# Patient Record
Sex: MALE | Race: WHITE | Employment: OTHER | URBAN - METROPOLITAN AREA
[De-identification: names, ages, dates, MRNs, and addresses within clinical notes are randomized per-mention and may not be internally consistent; named-entity substitution may affect disease eponyms.]

---

## 2017-04-14 ENCOUNTER — ANESTHESIA EVENT (OUTPATIENT)
Dept: SURGERY | Age: 77
End: 2017-04-14
Payer: MEDICARE

## 2017-04-17 ENCOUNTER — ANESTHESIA (OUTPATIENT)
Dept: SURGERY | Age: 77
End: 2017-04-17
Payer: MEDICARE

## 2017-04-17 PROCEDURE — 74011000250 HC RX REV CODE- 250

## 2017-04-17 PROCEDURE — 74011250636 HC RX REV CODE- 250/636: Performed by: ANESTHESIOLOGY

## 2017-04-17 PROCEDURE — 74011250636 HC RX REV CODE- 250/636

## 2017-04-17 RX ORDER — LIDOCAINE HYDROCHLORIDE 20 MG/ML
INJECTION, SOLUTION EPIDURAL; INFILTRATION; INTRACAUDAL; PERINEURAL AS NEEDED
Status: DISCONTINUED | OUTPATIENT
Start: 2017-04-17 | End: 2017-04-17 | Stop reason: HOSPADM

## 2017-04-17 RX ORDER — PROPOFOL 10 MG/ML
INJECTION, EMULSION INTRAVENOUS AS NEEDED
Status: DISCONTINUED | OUTPATIENT
Start: 2017-04-17 | End: 2017-04-17 | Stop reason: HOSPADM

## 2017-04-17 RX ADMIN — PROPOFOL 40 MG: 10 INJECTION, EMULSION INTRAVENOUS at 07:54

## 2017-04-17 RX ADMIN — SODIUM CHLORIDE, POTASSIUM CHLORIDE, SODIUM LACTATE AND CALCIUM CHLORIDE: 600; 310; 30; 20 INJECTION, SOLUTION INTRAVENOUS at 07:15

## 2017-04-17 RX ADMIN — PROPOFOL 80 MG: 10 INJECTION, EMULSION INTRAVENOUS at 07:40

## 2017-04-17 RX ADMIN — PROPOFOL 30 MG: 10 INJECTION, EMULSION INTRAVENOUS at 07:46

## 2017-04-17 RX ADMIN — LIDOCAINE HYDROCHLORIDE 50 MG: 20 INJECTION, SOLUTION EPIDURAL; INFILTRATION; INTRACAUDAL; PERINEURAL at 07:40

## 2017-04-17 RX ADMIN — PROPOFOL 30 MG: 10 INJECTION, EMULSION INTRAVENOUS at 07:50

## 2017-04-17 RX ADMIN — PROPOFOL 20 MG: 10 INJECTION, EMULSION INTRAVENOUS at 07:41

## 2017-04-17 NOTE — ANESTHESIA PREPROCEDURE EVALUATION
Anesthetic History   No history of anesthetic complications            Review of Systems / Medical History  Patient summary reviewed, nursing notes reviewed and pertinent labs reviewed    Pulmonary  Within defined limits                 Neuro/Psych   Within defined limits           Cardiovascular    Hypertension: well controlled              Exercise tolerance: >4 METS     GI/Hepatic/Renal  Within defined limits              Endo/Other    Diabetes: type 2    Arthritis     Other Findings              Physical Exam    Airway  Mallampati: I  TM Distance: 4 - 6 cm  Neck ROM: normal range of motion   Mouth opening: Normal     Cardiovascular    Rhythm: regular  Rate: normal      Pertinent negatives: No murmur   Dental  No notable dental hx       Pulmonary  Breath sounds clear to auscultation               Abdominal  GI exam deferred       Other Findings            Anesthetic Plan    ASA: 2  Anesthesia type: general and total IV anesthesia          Induction: Intravenous  Anesthetic plan and risks discussed with: Patient      preop glucose 166

## 2017-04-17 NOTE — ANESTHESIA POSTPROCEDURE EVALUATION
Post-Anesthesia Evaluation and Assessment    Patient: Jes Jauregui MRN: [id number]  SSN: [id number]    YOB: 1940  Age: 78 y.o. Sex: male       Cardiovascular Function/Vital Signs  Visit Vitals    /68    Pulse 65    Temp 36.6 °C (97.8 °F)    Resp 12    Ht 6' 2\" (1.88 m)    Wt 102.1 kg (225 lb)    SpO2 99%    BMI 28.89 kg/m2       Patient is status post general, total IV anesthesia anesthesia for Procedure(s):  COLONOSCOPY  ENDOSCOPIC POLYPECTOMY. Nausea/Vomiting: None    Postoperative hydration reviewed and adequate. Pain:  Pain Scale 1: Numeric (0 - 10) (04/17/17 4769)  Pain Intensity 1: 0 (04/17/17 3551)   Managed    Neurological Status:   Neuro (WDL): Exceptions to WDL (04/17/17 0827)  Neuro  Neurologic State: Alert (04/17/17 0827)  LUE Motor Response: Purposeful (04/17/17 0827)  LLE Motor Response: Purposeful (04/17/17 0827)  RUE Motor Response: Purposeful (04/17/17 0827)  RLE Motor Response: Purposeful (04/17/17 0827)   At baseline    Mental Status and Level of Consciousness: Arousable    Pulmonary Status:   O2 Device: Room air (04/17/17 0807)   Adequate oxygenation and airway patent    Complications related to anesthesia: None    Post-anesthesia assessment completed.  No concerns    Signed By: Yogi Zuniga MD     April 17, 2017

## 2022-02-21 RX ORDER — METFORMIN HYDROCHLORIDE 1000 MG/1
1000 TABLET ORAL 2 TIMES DAILY WITH MEALS
COMMUNITY

## 2022-02-21 RX ORDER — ATORVASTATIN CALCIUM 40 MG/1
40 TABLET, FILM COATED ORAL DAILY
COMMUNITY

## 2022-02-21 NOTE — PERIOP NOTES
[facility]  Ambulatory Surgery Unit  Pre-operative Instructions    Surgery/Procedure Date  Monday, February 28, 2022            Tentative Arrival Time TBD      1. On the day of your surgery/procedure, please report to the Ambulatory Surgery Unit Registration Desk and sign in at your designated time. The Ambulatory Surgery Unit is located in [facility] on the [facility] side of the [facility] across from the [address] [place] [address]. Please have all of your health insurance cards and a photo ID. **Due to current COVID restrictions, only ONE adult may accompany you the day of the procedure. We have limited seating available. If our waiting room is at capacity, your ride may be asked to remain in their vehicle. No children are allowed in the waiting room. 2. You must have someone with you to drive you home, as you should not drive a car for 24 hours following anesthesia. Please make arrangements for a responsible adult friend or family member to stay with you for at least the first 24 hours after your surgery. 3. Do not have anything to eat or drink (including water, gum, mints, coffee, juice) after 11:59 PM, Sunday. This may not apply to medications prescribed by your physician. (Please note below the special instructions with medications to take the morning of surgery, if applicable.)    4. We recommend you do not drink any alcoholic beverages for 24 hours before and after your surgery. 5. Contact your surgeons office for instructions on the following medications: non-steroidal anti-inflammatory drugs (i.e. Advil, Aleve), vitamins, and supplements. (Some surgeons will want you to stop these medications prior to surgery and others may allow you to take them)   **If you are currently taking Plavix, Coumadin, Aspirin and/or other blood-thinning agents, contact your surgeon for instructions. ** Your surgeon will partner with the physician prescribing these medications to determine if it is safe to stop or if you need to continue taking. Please do not stop taking these medications without instructions from your surgeon. 6. In an effort to help prevent surgical site infection, we ask that you shower with an anti-bacterial soap (i.e. Dial/Safeguard, or the soap provided to you at your preadmission testing appointment) for 3 days prior to and on the morning of surgery, using a fresh towel after each shower. (Please begin this process with fresh bed linens.) Do not apply any lotions, powders, or deodorants after the shower on the day of your procedure. If applicable, please do not shave the operative site for 48 hours prior to surgery. 7. Wear comfortable clothes. Wear glasses instead of contacts. Do not bring any jewelry or money (other than copays or fees as instructed). Do not wear make-up, particularly mascara, the morning of your surgery. Do not wear nail polish, particularly if you are having foot /hand surgery. Wear your hair loose or down, no ponytails, buns, heidy pins or clips. All body piercings must be removed. 8. You should understand that if you do not follow these instructions your surgery may be cancelled. If your physical condition changes (i.e. fever, cold or flu) please contact your surgeon as soon as possible. 9. It is important that you be on time. If a situation occurs where you may be late, or if you have any questions or problems, please call [phone].    10. Your surgery time may be subject to change. You will receive a phone call the day prior to surgery to confirm your arrival time. 11. Pediatric patients: please bring a change of clothes, diapers, bottle/sippy cup, pacifier, etc.      Special Instructions: Take all medications and inhalers, as prescribed, on the morning of surgery with a sip of water EXCEPT: no diabetic medications day of surgery. I understand a pre-operative phone call will be made to verify my surgery time.   In the event that I am not available, I give permission for a message to be left on my answering service and/or with another person?       yes    Preop instructions reviewed  Pt verbalized understanding.      ___________________      ___________________      ________________  (Signature of Patient)          (Witness)                   (Date and Time)

## 2022-02-24 ENCOUNTER — HOSPITAL ENCOUNTER (OUTPATIENT)
Dept: PREADMISSION TESTING | Age: 82
Discharge: HOME | End: 2022-02-24
Payer: MEDICARE

## 2022-02-24 LAB
SARS-COV-2, XPLCVT: NOT DETECTED
SOURCE, COVRS: NORMAL

## 2022-02-24 PROCEDURE — U0005 INFEC AGEN DETEC AMPLI PROBE: HCPCS

## 2022-02-25 ENCOUNTER — ANESTHESIA EVENT (OUTPATIENT)
Dept: SURGERY | Age: 82
End: 2022-02-25
Payer: MEDICARE

## 2022-02-28 ENCOUNTER — HOSPITAL ENCOUNTER (OUTPATIENT)
Age: 82
Setting detail: OUTPATIENT SURGERY
Discharge: HOME | End: 2022-02-28
Attending: OPHTHALMOLOGY | Admitting: OPHTHALMOLOGY
Payer: MEDICARE

## 2022-02-28 ENCOUNTER — ANESTHESIA (OUTPATIENT)
Dept: SURGERY | Age: 82
End: 2022-02-28
Payer: MEDICARE

## 2022-02-28 LAB
GLUCOSE BLD STRIP.AUTO-MCNC: 159 MG/DL (ref 65–117)
GLUCOSE BLD STRIP.AUTO-MCNC: 168 MG/DL (ref 65–117)
SERVICE CMNT-IMP: ABNORMAL
SERVICE CMNT-IMP: ABNORMAL

## 2022-02-28 PROCEDURE — 74011000250 HC RX REV CODE- 250: Performed by: NURSE ANESTHETIST, CERTIFIED REGISTERED

## 2022-02-28 PROCEDURE — 82962 GLUCOSE BLOOD TEST: CPT

## 2022-02-28 PROCEDURE — 74011250636 HC RX REV CODE- 250/636: Performed by: NURSE ANESTHETIST, CERTIFIED REGISTERED

## 2022-02-28 PROCEDURE — 74011000250 HC RX REV CODE- 250

## 2022-02-28 PROCEDURE — C1783 OCULAR IMP, AQUEOUS DRAIN DE: HCPCS | Performed by: OPHTHALMOLOGY

## 2022-02-28 PROCEDURE — 74011000250 HC RX REV CODE- 250: Performed by: OPHTHALMOLOGY

## 2022-02-28 PROCEDURE — 74011250636 HC RX REV CODE- 250/636: Performed by: OPHTHALMOLOGY

## 2022-02-28 PROCEDURE — 77030021352 HC CBL LD SYS DISP COVD -B: Performed by: OPHTHALMOLOGY

## 2022-02-28 PROCEDURE — 76210000050 HC AMBSU PH II REC 0.5 TO 1 HR: Performed by: OPHTHALMOLOGY

## 2022-02-28 PROCEDURE — 76060000061 HC AMB SURG ANES 0.5 TO 1 HR: Performed by: OPHTHALMOLOGY

## 2022-02-28 PROCEDURE — 76030000000 HC AMB SURG OR TIME 0.5 TO 1: Performed by: OPHTHALMOLOGY

## 2022-02-28 PROCEDURE — V2632 POST CHMBR INTRAOCULAR LENS: HCPCS | Performed by: OPHTHALMOLOGY

## 2022-02-28 PROCEDURE — 2709999900 HC NON-CHARGEABLE SUPPLY: Performed by: OPHTHALMOLOGY

## 2022-02-28 DEVICE — LENS IOL POST 1-PC 6X13 16.0 -- ACRYSOF: Type: IMPLANTABLE DEVICE | Site: EYE | Status: FUNCTIONAL

## 2022-02-28 DEVICE — TRABECULAR MICRO-BYPASS STENT SYSTEM - LEFT
Type: IMPLANTABLE DEVICE | Site: EYE | Status: FUNCTIONAL
Brand: ISTENT

## 2022-02-28 RX ORDER — SODIUM CHLORIDE, SODIUM LACTATE, POTASSIUM CHLORIDE, CALCIUM CHLORIDE 600; 310; 30; 20 MG/100ML; MG/100ML; MG/100ML; MG/100ML
25 INJECTION, SOLUTION INTRAVENOUS CONTINUOUS
Status: DISCONTINUED | OUTPATIENT
Start: 2022-02-28 | End: 2022-02-28 | Stop reason: HOSPADM

## 2022-02-28 RX ORDER — CIPROFLOXACIN HYDROCHLORIDE 3.5 MG/ML
1 SOLUTION/ DROPS TOPICAL
Status: COMPLETED | OUTPATIENT
Start: 2022-02-28 | End: 2022-02-28

## 2022-02-28 RX ORDER — MIDAZOLAM HYDROCHLORIDE 1 MG/ML
INJECTION, SOLUTION INTRAMUSCULAR; INTRAVENOUS AS NEEDED
Status: DISCONTINUED | OUTPATIENT
Start: 2022-02-28 | End: 2022-02-28 | Stop reason: HOSPADM

## 2022-02-28 RX ORDER — FENTANYL CITRATE 50 UG/ML
INJECTION, SOLUTION INTRAMUSCULAR; INTRAVENOUS AS NEEDED
Status: DISCONTINUED | OUTPATIENT
Start: 2022-02-28 | End: 2022-02-28 | Stop reason: HOSPADM

## 2022-02-28 RX ORDER — DIPHENHYDRAMINE HYDROCHLORIDE 50 MG/ML
12.5 INJECTION, SOLUTION INTRAMUSCULAR; INTRAVENOUS AS NEEDED
Status: DISCONTINUED | OUTPATIENT
Start: 2022-02-28 | End: 2022-02-28 | Stop reason: HOSPADM

## 2022-02-28 RX ORDER — LIDOCAINE HYDROCHLORIDE 10 MG/ML
0.1 INJECTION, SOLUTION EPIDURAL; INFILTRATION; INTRACAUDAL; PERINEURAL AS NEEDED
Status: DISCONTINUED | OUTPATIENT
Start: 2022-02-28 | End: 2022-02-28 | Stop reason: HOSPADM

## 2022-02-28 RX ORDER — DEXMEDETOMIDINE HYDROCHLORIDE 100 UG/ML
INJECTION, SOLUTION INTRAVENOUS AS NEEDED
Status: DISCONTINUED | OUTPATIENT
Start: 2022-02-28 | End: 2022-02-28 | Stop reason: HOSPADM

## 2022-02-28 RX ORDER — SODIUM CHLORIDE 0.9 % (FLUSH) 0.9 %
5-40 SYRINGE (ML) INJECTION EVERY 8 HOURS
Status: DISCONTINUED | OUTPATIENT
Start: 2022-02-28 | End: 2022-02-28 | Stop reason: HOSPADM

## 2022-02-28 RX ORDER — SODIUM CHLORIDE 0.9 % (FLUSH) 0.9 %
5-40 SYRINGE (ML) INJECTION AS NEEDED
Status: DISCONTINUED | OUTPATIENT
Start: 2022-02-28 | End: 2022-02-28 | Stop reason: HOSPADM

## 2022-02-28 RX ORDER — SODIUM CHLORIDE 9 MG/ML
25 INJECTION, SOLUTION INTRAVENOUS ONCE
Status: COMPLETED | OUTPATIENT
Start: 2022-02-28 | End: 2022-02-28

## 2022-02-28 RX ORDER — NEOMYCIN SULFATE, POLYMYXIN B SULFATE, AND DEXAMETHASONE 3.5; 10000; 1 MG/G; [USP'U]/G; MG/G
OINTMENT OPHTHALMIC AS NEEDED
Status: DISCONTINUED | OUTPATIENT
Start: 2022-02-28 | End: 2022-02-28 | Stop reason: HOSPADM

## 2022-02-28 RX ORDER — FENTANYL CITRATE 50 UG/ML
25 INJECTION, SOLUTION INTRAMUSCULAR; INTRAVENOUS
Status: DISCONTINUED | OUTPATIENT
Start: 2022-02-28 | End: 2022-02-28 | Stop reason: HOSPADM

## 2022-02-28 RX ORDER — TETRACAINE HYDROCHLORIDE 5 MG/ML
SOLUTION OPHTHALMIC AS NEEDED
Status: DISCONTINUED | OUTPATIENT
Start: 2022-02-28 | End: 2022-02-28 | Stop reason: HOSPADM

## 2022-02-28 RX ORDER — TIMOLOL MALEATE 5 MG/ML
SOLUTION/ DROPS OPHTHALMIC AS NEEDED
Status: DISCONTINUED | OUTPATIENT
Start: 2022-02-28 | End: 2022-02-28 | Stop reason: HOSPADM

## 2022-02-28 RX ORDER — TROPICAMIDE 10 MG/ML
1 SOLUTION/ DROPS OPHTHALMIC
Status: COMPLETED | OUTPATIENT
Start: 2022-02-28 | End: 2022-02-28

## 2022-02-28 RX ORDER — SODIUM CHLORIDE 9 MG/ML
INJECTION, SOLUTION INTRAVENOUS
Status: DISCONTINUED | OUTPATIENT
Start: 2022-02-28 | End: 2022-02-28 | Stop reason: HOSPADM

## 2022-02-28 RX ORDER — ONDANSETRON 2 MG/ML
4 INJECTION INTRAMUSCULAR; INTRAVENOUS AS NEEDED
Status: DISCONTINUED | OUTPATIENT
Start: 2022-02-28 | End: 2022-02-28 | Stop reason: HOSPADM

## 2022-02-28 RX ORDER — TROPICAMIDE 10 MG/ML
SOLUTION/ DROPS OPHTHALMIC
Status: COMPLETED
Start: 2022-02-28 | End: 2022-02-28

## 2022-02-28 RX ADMIN — FENTANYL CITRATE 25 MCG: 50 INJECTION, SOLUTION INTRAMUSCULAR; INTRAVENOUS at 08:52

## 2022-02-28 RX ADMIN — SODIUM CHLORIDE: 0.9 INJECTION, SOLUTION INTRAVENOUS at 08:43

## 2022-02-28 RX ADMIN — DEXMEDETOMIDINE HYDROCHLORIDE 2 MCG: 100 INJECTION, SOLUTION, CONCENTRATE INTRAVENOUS at 08:58

## 2022-02-28 RX ADMIN — TROPICAMIDE 1 DROP: 10 SOLUTION/ DROPS OPHTHALMIC at 08:12

## 2022-02-28 RX ADMIN — CIPROFLOXACIN 1 DROP: 3 SOLUTION OPHTHALMIC at 08:05

## 2022-02-28 RX ADMIN — TROPICAMIDE 1 DROP: 10 SOLUTION/ DROPS OPHTHALMIC at 08:09

## 2022-02-28 RX ADMIN — CIPROFLOXACIN 1 DROP: 3 SOLUTION OPHTHALMIC at 08:09

## 2022-02-28 RX ADMIN — FENTANYL CITRATE 25 MCG: 50 INJECTION, SOLUTION INTRAMUSCULAR; INTRAVENOUS at 08:56

## 2022-02-28 RX ADMIN — SODIUM CHLORIDE 25 ML/HR: 9 INJECTION, SOLUTION INTRAVENOUS at 08:05

## 2022-02-28 RX ADMIN — CIPROFLOXACIN 1 DROP: 3 SOLUTION OPHTHALMIC at 08:12

## 2022-02-28 RX ADMIN — DEXMEDETOMIDINE HYDROCHLORIDE 2 MCG: 100 INJECTION, SOLUTION, CONCENTRATE INTRAVENOUS at 09:08

## 2022-02-28 RX ADMIN — MIDAZOLAM HYDROCHLORIDE 1 MG: 1 INJECTION, SOLUTION INTRAMUSCULAR; INTRAVENOUS at 08:44

## 2022-02-28 RX ADMIN — MIDAZOLAM HYDROCHLORIDE 1 MG: 1 INJECTION, SOLUTION INTRAMUSCULAR; INTRAVENOUS at 08:49

## 2022-02-28 RX ADMIN — TROPICAMIDE 1 DROP: 10 SOLUTION/ DROPS OPHTHALMIC at 08:05

## 2022-02-28 NOTE — DISCHARGE INSTRUCTIONS
Huey Arango MD  [address] [place] [address] Drive   JAKE [place]au, [address]  Phone: [phone]  If you are unable to keep appointment, kindly give 24 hours notice please. REMOVE PATCH  START DROPS WHEN YOU GET HOME  PUT PATCH BACK ON AT BEDTIME    1. DO NOT RUB the eye that was operated on. 2. Do not strain excessively. It is all right to bend as long as you do not strain. 3. It is safe to take a shower, wash your face, and wash your hair. Just keep the eye closed. 4. Do not swim for 1 week after surgery. 5. If you have any problems or questions, do not hesitate to call .  6. Follow instructions on eye drops from office. 7. You may take Tylenol or Advil for discomfort. If it pressure not relieved by Tylenol or Advil, please call Dr. Carly Castellano office. TO PREVENT AN INFECTION      1. [address] YOUR HANDS     To prevent infection, good handwashing is the most important thing you or your caregiver can do.  Wash your hands with soap and water or use the hand  we gave you before you touch any wounds. 2.  USE CLEAN SHEETS     Use freshly cleaned sheets on your bed after surgery.  To keep the surgery site clean, do not allow pets to sleep with you while your wound is still healing. 3. STOP SMOKING    Stop smoking, or at least cut back on smoking     Smoking slows your healing. 4.  CONTROL YOUR BLOOD SUGAR     High blood sugars slow wound healing.  If you are diabetic, control your blood sugar levels before and after your surgery. If you were given prescriptions, please review the written information on the prescribed medications. DO NOT DRIVE WHILE TAKING NARCOTIC PAIN MEDICATIONS.     DISCHARGE SUMMARY from Nurse    The following personal items collected during your admission are returned to you:   Dental Appliance: Dental Appliances: None  Vision: Visual Aid: Glasses (Glasses in PACU)  Hearing Aid:    Jewelry: Jewelry: None  Clothing: Clothing: With patient  Other Valuables: Other Valuables: Eyeglasses (Glasses in PACU)  Valuables sent to safe:      PATIENT INSTRUCTIONS:    After general anesthesia or intravenous sedation, for 24 hours or while taking prescription Narcotics:  · Someone should be with you for the next 24 hours. · For your own safety, a responsible adult must drive you home. · Limit your activities  · Recommended activity: Rest today, Do not climb stairs or shower unattended for the next 24 hours. · Do not drive and operate hazardous machinery  · Do not make important personal or business decisions  · Do  not drink alcoholic beverages  · If you have not urinated within 8 hours after discharge, please contact your surgeon on call. Report the following to your surgeon:  · Excessive pain, swelling, redness or odor of or around the surgical area  · Temperature over 100.5  · Nausea and vomiting lasting longer than 4 hours or if unable to take medications    · You will receive a Post Operative Call from one of the Recovery Room Nurses on the day after your surgery to check on you. It is very important for us to know how you are recovering after your surgery. · You may receive an e-mail or letter in the mail from [employer] regarding your experience with us in the Ambulatory Surgery Unit. Your feedback is valuable to us and we appreciate your participation in the survey. · We wish youre a speedy recovery ? What to do at Home:      *  Please give a list of your current medications to your Primary Care Provider. *  Please update this list whenever your medications are discontinued, doses are      changed, or new medications (including over-the-counter products) are added. *  Please carry medication information at all times in case of emergency situations.           These are general instructions for a healthy lifestyle:    No smoking/ No tobacco products/ Avoid exposure to second hand smoke    Surgeon Jeff Levin Warning:  Quitting smoking now greatly reduces serious risk to your health. Obesity, smoking, and sedentary lifestyle greatly increases your risk for illness    A healthy diet, regular physical exercise & weight monitoring are important for maintaining a healthy lifestyle    You may be retaining fluid if you have a history of heart failure or if you experience any of the following symptoms:  Weight gain of 3 pounds or more overnight or 5 pounds in a week, increased swelling in our hands or feet or shortness of breath while lying flat in bed. Please call your doctor as soon as you notice any of these symptoms; do not wait until your next office visit. Recognize signs and symptoms of STROKE:    B - Balance  E - Eyes    F-face looks uneven    A-arms unable to move or move even    S-speech slurred or non-existent    T-time-call 911 as soon as signs and symptoms begin-DO NOT go       Back to bed or wait to see if you get better-TIME IS BRAIN. If you have not received your influenza and/or pneumococcal vaccine, please follow up with your primary care physician. The discharge information has been reviewed with the patient and caregiver. The patient and caregiver verbalized understanding.

## 2022-02-28 NOTE — PERIOP NOTES
Pt tolerating liquids, vss, blood pressure on low side of normal.  Pt denies pain. Blood glucose is 168 by fingerstick. 0958-D/c instructions reviewed and completed. All questions answered. Reviewed when to call the doctor, diet,activity and hygiene. Reviewed when to start eye drops, what and when to take tylenol or advil for pain. 1019-Transported via w/c to awaiting transportation.

## 2022-02-28 NOTE — OP NOTES
Date of Procedure: 2/28/2022  Preoperative Diagnosis: Nuclear Sclerotic cataract right eye (H25.11), primary open angle glaucoma right eye (E20.0535)  Postoperative Diagnosis: Nuclear Sclerotic cataract right eye (H25.11), primary open angle glaucoma right eye (K04.7716)  Procedure: Extracapsular cataract extraction with lens implant right eye, I-stent placement right eye  Surgeon:  KOKO Castelan MD  Assistants: None  Anesthesia: MAC with local  Estimated Blood Loss: None  Findings: Cataract right eye, open angle glaucoma right eye  Complications: None  Specimens: None  Prosthetic Devices: Intraocular lens implant, I-stent right eye     The patient's right eye was dilated with mydriacyl 1% and ciprofloxacin 0.3% for 3 doses preoperatively. The patient was taken to the operating room and was given sedation. Tetracaine was given topically to the right eye, and the eye was prepped and draped in the usual manner for sterile eye surgery, including Betadine solution being dropped onto the conjunctiva at the beginning of the prep. The eyelashes were isolated with a plastic drape. A lid speculum was placed.     Limbal relaxing incisions were made at the beginning of the case. A corneal marking gauge was used to make a 50 degree arc length at the 175 degree axis temporally and nasally. After the marking was made, as small amount of Viscoat (Duovisc) was placed on the incision sites, and a 600 micron depth earlene knife was used to make the 50 degree arc nasally and temporally one half millimeters in from the limbus.     A #15 blade was used to make a paracentesis at the 10:00 location. The eye was flushed with a lidocaine / epinephrine mixture (\"Shugarcaine\"). The eye was filled with Viscoat (Duovisc), and a crescent blade was used to make a 2.5 mm incision at the limbus temporally. This was dissected 2 mm into clear cornea, and the eye was entered with a 2.4 mm keratome.   A 0.12 forceps was used for fixation during the procedure. A capsulorhexis flap was started with a cystotome, and this was completed 360 degrees with Utrata forceps. The capsular piece was removed. [place] dissection was performed with the \"Shugarcaine\" mixture on a cannula.     The lens nucleus was removed using phacoemulsification with a total phaco time of 1:20 minutes at 16.0%.     The lens was cracked and manipulated with a Sinsky hook. Residual cortex was removed using irrigation / aspiration. The capsule remained intact.        The capsule was refilled with Provisc (Duovisc), and an Michael Intraocular lens model SN60WF power 16.0 was placed in a lens folding cartridge with Provisc.     The lens was unfolded into the capsular bag. The lens centered well. The I-stent was then placed. Additional viscoat was used nasally to separate the iris from the cornea in the nasal angle. The patient's head was rotated nasally 45 degrees and the operating microscope was turned toward the surgeon 45 degrees. Viscoat was placed on the corneal surface and a gonio prism was placed on the cornea. The trabecular meshwork was clearly visible after magnification was increased and the focusing. The I-stent was inspected and was found to be in good condition on the , and it was introduced into the eye through the main incision. Under direct visualization the I-stent was engaged in the trabecular meshwork nasally and pushed into position. There was slight bleeding from the I-stent. It was strummed and tapped with the  to insure proper positioning. The  was removed from the eye and the head was rotated back to the normal position. Residual Provisc and Viscoat were removed using I / A. No suture was required to close the incision. The eye was flushed with BSS through the paracentesis. Betadine solution was placed on the conjunctival surface at the end of the case. The eye was left soft and formed at the end of the case.   The incision site was water tight. The speculum was removed, and a drop of timolol 0.5% and neomycin/polymixin/dexamethasone ointment was placed on the eye followed by a shield. The patient tolerated the procedure well and is to follow-up in one day.

## 2022-02-28 NOTE — ANESTHESIA POSTPROCEDURE EVALUATION
Procedure(s):  RIGHT FIRST EYE CATARACT EXTRACTION WITH INTRAOCULAR LENS WITH ISTENT. MAC    Anesthesia Post Evaluation      Multimodal analgesia: multimodal analgesia used between 6 hours prior to anesthesia start to PACU discharge  Patient location during evaluation: PACU  Patient participation: complete - patient participated  Level of consciousness: awake and alert  Pain score: 0  Airway patency: patent  Anesthetic complications: no  Cardiovascular status: acceptable  Respiratory status: acceptable  Hydration status: acceptable  Post anesthesia nausea and vomiting:  none  Final Post Anesthesia Temperature Assessment:  Normothermia (36.0-37.5 degrees C)      INITIAL Post-op Vital signs:   Vitals Value Taken Time   /49 02/28/22 0953   Temp 36.7 °C (98 °F) 02/28/22 0944   Pulse 64 02/28/22 0957   Resp 16 02/28/22 0957   SpO2 95 % 02/28/22 0957   Vitals shown include unvalidated device data.

## 2022-02-28 NOTE — ANESTHESIA PREPROCEDURE EVALUATION
Relevant Problems   No relevant active problems       Anesthetic History   No history of anesthetic complications            Review of Systems / Medical History  Patient summary reviewed, nursing notes reviewed and pertinent labs reviewed    Pulmonary  Within defined limits                 Neuro/Psych             Comments: [place]  vertigo Cardiovascular    Hypertension              Exercise tolerance: >4 METS     GI/Hepatic/Renal  Within defined limits              Endo/Other    Diabetes: type 2    Arthritis     Other Findings   Comments: Cataracts  glaucoma         Physical Exam    Airway  Mallampati: III  TM Distance: 4 - 6 cm  Neck ROM: normal range of motion   Mouth opening: Normal     Cardiovascular    Rhythm: regular  Rate: normal         Dental    Dentition: Poor dentition  Comments: Multiple missing teeth   Pulmonary  Breath sounds clear to auscultation               Abdominal  GI exam deferred       Other Findings            Anesthetic Plan    ASA: 2  Anesthesia type: MAC          Induction: Intravenous  Anesthetic plan and risks discussed with: Patient What Type Of Note Output Would You Prefer (Optional)?: Standard Output Hpi Title: Evaluation of Skin Lesions How Severe Are Your Spot(S)?: mild Have Your Spot(S) Been Treated In The Past?: has not been treated

## 2022-02-28 NOTE — BRIEF OP NOTE
Brief Postoperative Note    Patient: Monalisa Sosa  YOB: 1940  MRN: [id number]    Date of Procedure: 2/28/2022     Pre-Op Diagnosis: Nuclear sclerotic cataract of right eye [H25.11]  Primary open angle glaucoma of right eye, moderate stage [H40.1112]    Post-Op Diagnosis: Nuclear Sclerotic cataract right eye (H25.11), primary open angle glaucoma right eye (H40.1112)    Procedure(s):  RIGHT FIRST EYE CATARACT EXTRACTION WITH INTRAOCULAR LENS WITH ISTENT    Surgeon(s):  Maru Deleon MD    Surgical Assistant: None    Anesthesia: MAC     Estimated Blood Loss (mL): 0    Complications: none    Specimens: * No specimens in log *     Implants:   Implant Name Type Inv.  Item Serial No.  Lot No. LRB No. Used Action   SHUNT EYE TRABECLAR [place] [employer] -- ISTENT - [id number]  SHUNT EYE TRABECLAR [place] [employer]AS -- ISTENT [id number] [id number] 1 Implanted   LENS IOL POST 1-PC 6X13 16.0 -- [employer]YS[employer] - [id number]  LENS IOL POST 1-PC 6X13 16.0 -- [employer]OF [id number] [employer] N/A Right 1 Implanted       Drains: * No LDAs found *    Findings: Visually significant cataract right eye and moderate open angle glaucoma left eye    Electronically Signed by Donna Lipscomb MD on 2/28/2022 at 9:33 AM

## 2022-02-28 NOTE — PERIOP NOTES
Permission received to review discharge instructions and discuss private health information with wife, Monty Graham. Patient states that wife will be with them for at least 24 hours following today's procedure.

## 2022-02-28 NOTE — PERIOP NOTES
Thea Rosales  1940  [id number]    Situation:  Verbal report given from: RADHA Yeh, ROBINA Olivia RN  Procedure: Procedure(s):  RIGHT FIRST EYE CATARACT EXTRACTION WITH INTRAOCULAR LENS WITH ISTENT    Background:    Preoperative diagnosis: Nuclear sclerotic cataract of right eye [H25.11]  Primary open angle glaucoma of right eye, moderate stage [H40.1112]    Postoperative diagnosis: uclear sclerotic cataract of right eye [H25.11]  Primary open angle glaucoma of right eye, moderate stage [D96.5827]    :  Dr. Luis Fernando Pike    Assistant(s): Circ-1: Scott Sofia  Circ-2: Evelyn Mohr RN  Scrub Tech-1: Eric DOTSON RT    Specimens: * No specimens in log *    Assessment:  Intra-procedure medications         Anesthesia gave intra-procedure sedation and medications, see anesthesia flow sheet     Intravenous fluids: LR@ KVO     Vital signs stable       Recommendation:

## 2022-03-01 VITALS
TEMPERATURE: 98 F | SYSTOLIC BLOOD PRESSURE: 100 MMHG | WEIGHT: 223 LBS | RESPIRATION RATE: 16 BRPM | BODY MASS INDEX: 28.62 KG/M2 | OXYGEN SATURATION: 96 % | HEIGHT: 74 IN | DIASTOLIC BLOOD PRESSURE: 62 MMHG | HEART RATE: 61 BPM

## 2022-03-01 NOTE — PERIOP NOTES
Called patient, he said that he is feeling okay after his procedure yesterday and already went for his appointment with Dr. Nan Boyd this morning. He said that there is nothing that we could have done better.

## 2022-03-01 NOTE — PERIOP NOTES
[facility]  Ambulatory Surgery Unit  Pre-operative Instructions    Surgery/Procedure Date  3/7/2022              Tentative Arrival Time TBD      1. On the day of your surgery/procedure, please report to the Ambulatory Surgery Unit Registration Desk and sign in at your designated time. The Ambulatory Surgery Unit is located in [facility] on the [facility] side of the [facility] across from the [address] [place] [address]. Please have all of your health insurance cards and a photo ID. **Due to current COVID restrictions, only ONE adult may accompany you the day of the procedure. We have limited seating available. If our waiting room is at capacity, your ride may be asked to remain in their vehicle. No children are allowed in the waiting room. 2. You must have someone with you to drive you home, as you should not drive a car for 24 hours following anesthesia. Please make arrangements for a responsible adult friend or family member to stay with you for at least the first 24 hours after your surgery. 3. Do not have anything to eat or drink (including water, gum, mints, coffee, juice) after 11:59 PM  3/6/2022. This may not apply to medications prescribed by your physician. (Please note below the special instructions with medications to take the morning of surgery, if applicable.)    4. We recommend you do not drink any alcoholic beverages for 24 hours before and after your surgery. 5. Contact your surgeons office for instructions on the following medications: non-steroidal anti-inflammatory drugs (i.e. Advil, Aleve), vitamins, and supplements. (Some surgeons will want you to stop these medications prior to surgery and others may allow you to take them)   **If you are currently taking Plavix, Coumadin, Aspirin and/or other blood-thinning agents, contact your surgeon for instructions. ** Your surgeon will partner with the physician prescribing these medications to determine if it is safe to stop or if you need to continue taking. Please do not stop taking these medications without instructions from your surgeon. 6. In an effort to help prevent surgical site infection, we ask that you shower with an anti-bacterial soap (i.e. Dial/Safeguard, or the soap provided to you at your preadmission testing appointment) on the morning of surgery, using a fresh towel after each shower. (Please begin this process with fresh bed linens.) Do not apply any lotions, powders, or deodorants after the shower on the day of your procedure. If applicable, please do not shave the operative site for 48 hours prior to surgery. 7. Wear comfortable clothes. Wear glasses instead of contacts. Do not bring any jewelry or money (other than copays or fees as instructed). Do not wear make-up, particularly mascara, the morning of your surgery. Do not wear nail polish, particularly if you are having foot /hand surgery. Wear your hair loose or down, no ponytails, buns, heidy pins or clips. All body piercings must be removed. 8. You should understand that if you do not follow these instructions your surgery may be cancelled. If your physical condition changes (i.e. fever, cold or flu) please contact your surgeon as soon as possible. 9. It is important that you be on time. If a situation occurs where you may be late, or if you have any questions or problems, please call [phone].    10. Your surgery time may be subject to change. You will receive a phone call the day prior to surgery to confirm your arrival time. 11. Pediatric patients: please bring a change of clothes, diapers, bottle/sippy cup, pacifier, etc.      Special Instructions: Take all medications and inhalers, as prescribed, on the morning of surgery with a sip of water EXCEPT: Diabetic medications      Insulin Dependent Diabetic patients: Take your diabetic medications as prescribed the day before surgery.   Hold all diabetic medications the day of surgery. If you are scheduled to arrive for surgery after 8:00 AM, and your AM blood sugar is >200, please call Ambulatory Surgery. I understand a pre-operative phone call will be made to verify my surgery time. In the event that I am not available, I give permission for a message to be left on my answering service and/or with another person?       Yes      Reviewed instructions and given patient Covid test date 3/3/ 2022 between 7am to 1145 am. Patient able to verbalized understanding.         ___________________      ___________________      ________________  (Signature of Patient)          (Witness)                   (Date and Time)

## 2022-03-03 ENCOUNTER — HOSPITAL ENCOUNTER (OUTPATIENT)
Dept: PREADMISSION TESTING | Age: 82
Discharge: HOME | End: 2022-03-03
Payer: MEDICARE

## 2022-03-03 PROCEDURE — U0005 INFEC AGEN DETEC AMPLI PROBE: HCPCS

## 2022-03-04 ENCOUNTER — ANESTHESIA EVENT (OUTPATIENT)
Dept: SURGERY | Age: 82
End: 2022-03-04
Payer: MEDICARE

## 2022-03-04 LAB
SARS-COV-2, XPLCVT: NOT DETECTED
SOURCE, COVRS: NORMAL

## 2022-03-07 ENCOUNTER — HOSPITAL ENCOUNTER (OUTPATIENT)
Age: 82
Setting detail: OUTPATIENT SURGERY
Discharge: HOME | End: 2022-03-07
Attending: OPHTHALMOLOGY | Admitting: OPHTHALMOLOGY
Payer: MEDICARE

## 2022-03-07 ENCOUNTER — ANESTHESIA (OUTPATIENT)
Dept: SURGERY | Age: 82
End: 2022-03-07
Payer: MEDICARE

## 2022-03-07 VITALS
BODY MASS INDEX: 28.49 KG/M2 | RESPIRATION RATE: 13 BRPM | WEIGHT: 222 LBS | HEIGHT: 74 IN | TEMPERATURE: 98.1 F | SYSTOLIC BLOOD PRESSURE: 107 MMHG | OXYGEN SATURATION: 96 % | DIASTOLIC BLOOD PRESSURE: 57 MMHG | HEART RATE: 61 BPM

## 2022-03-07 LAB
GLUCOSE BLD STRIP.AUTO-MCNC: 165 MG/DL (ref 65–117)
GLUCOSE BLD STRIP.AUTO-MCNC: 167 MG/DL (ref 65–117)
SERVICE CMNT-IMP: ABNORMAL
SERVICE CMNT-IMP: ABNORMAL

## 2022-03-07 PROCEDURE — V2632 POST CHMBR INTRAOCULAR LENS: HCPCS | Performed by: OPHTHALMOLOGY

## 2022-03-07 PROCEDURE — 82962 GLUCOSE BLOOD TEST: CPT

## 2022-03-07 PROCEDURE — 2709999900 HC NON-CHARGEABLE SUPPLY: Performed by: OPHTHALMOLOGY

## 2022-03-07 PROCEDURE — 76030000000 HC AMB SURG OR TIME 0.5 TO 1: Performed by: OPHTHALMOLOGY

## 2022-03-07 PROCEDURE — C1783 OCULAR IMP, AQUEOUS DRAIN DE: HCPCS | Performed by: OPHTHALMOLOGY

## 2022-03-07 PROCEDURE — 74011000250 HC RX REV CODE- 250: Performed by: OPHTHALMOLOGY

## 2022-03-07 PROCEDURE — 77030021352 HC CBL LD SYS DISP COVD -B: Performed by: OPHTHALMOLOGY

## 2022-03-07 PROCEDURE — 76210000040 HC AMBSU PH I REC FIRST 0.5 HR: Performed by: OPHTHALMOLOGY

## 2022-03-07 PROCEDURE — 74011250636 HC RX REV CODE- 250/636: Performed by: OPHTHALMOLOGY

## 2022-03-07 PROCEDURE — 74011000250 HC RX REV CODE- 250

## 2022-03-07 PROCEDURE — 74011250636 HC RX REV CODE- 250/636: Performed by: NURSE ANESTHETIST, CERTIFIED REGISTERED

## 2022-03-07 PROCEDURE — 76060000061 HC AMB SURG ANES 0.5 TO 1 HR: Performed by: OPHTHALMOLOGY

## 2022-03-07 PROCEDURE — 76210000046 HC AMBSU PH II REC FIRST 0.5 HR: Performed by: OPHTHALMOLOGY

## 2022-03-07 DEVICE — TRABECULAR MICRO-BYPASS STENT SYSTEM - LEFT
Type: IMPLANTABLE DEVICE | Site: EYE | Status: FUNCTIONAL
Brand: ISTENT

## 2022-03-07 DEVICE — LENS IOL POST 1-PC 6X13 14.5 -- ACRYSOF: Type: IMPLANTABLE DEVICE | Site: EYE | Status: FUNCTIONAL

## 2022-03-07 RX ORDER — TIMOLOL MALEATE 5 MG/ML
SOLUTION/ DROPS OPHTHALMIC AS NEEDED
Status: DISCONTINUED | OUTPATIENT
Start: 2022-03-07 | End: 2022-03-07 | Stop reason: HOSPADM

## 2022-03-07 RX ORDER — ONDANSETRON 2 MG/ML
4 INJECTION INTRAMUSCULAR; INTRAVENOUS AS NEEDED
Status: DISCONTINUED | OUTPATIENT
Start: 2022-03-07 | End: 2022-03-07 | Stop reason: HOSPADM

## 2022-03-07 RX ORDER — SODIUM CHLORIDE 0.9 % (FLUSH) 0.9 %
5-40 SYRINGE (ML) INJECTION EVERY 8 HOURS
Status: DISCONTINUED | OUTPATIENT
Start: 2022-03-07 | End: 2022-03-07 | Stop reason: HOSPADM

## 2022-03-07 RX ORDER — DIPHENHYDRAMINE HYDROCHLORIDE 50 MG/ML
12.5 INJECTION, SOLUTION INTRAMUSCULAR; INTRAVENOUS AS NEEDED
Status: DISCONTINUED | OUTPATIENT
Start: 2022-03-07 | End: 2022-03-07 | Stop reason: HOSPADM

## 2022-03-07 RX ORDER — SODIUM CHLORIDE, SODIUM LACTATE, POTASSIUM CHLORIDE, CALCIUM CHLORIDE 600; 310; 30; 20 MG/100ML; MG/100ML; MG/100ML; MG/100ML
25 INJECTION, SOLUTION INTRAVENOUS CONTINUOUS
Status: DISCONTINUED | OUTPATIENT
Start: 2022-03-07 | End: 2022-03-07 | Stop reason: HOSPADM

## 2022-03-07 RX ORDER — LIDOCAINE HYDROCHLORIDE 10 MG/ML
0.1 INJECTION, SOLUTION EPIDURAL; INFILTRATION; INTRACAUDAL; PERINEURAL AS NEEDED
Status: DISCONTINUED | OUTPATIENT
Start: 2022-03-07 | End: 2022-03-07 | Stop reason: HOSPADM

## 2022-03-07 RX ORDER — SODIUM CHLORIDE 0.9 % (FLUSH) 0.9 %
5-40 SYRINGE (ML) INJECTION AS NEEDED
Status: DISCONTINUED | OUTPATIENT
Start: 2022-03-07 | End: 2022-03-07 | Stop reason: HOSPADM

## 2022-03-07 RX ORDER — CIPROFLOXACIN HYDROCHLORIDE 3.5 MG/ML
1 SOLUTION/ DROPS TOPICAL
Status: COMPLETED | OUTPATIENT
Start: 2022-03-07 | End: 2022-03-07

## 2022-03-07 RX ORDER — SODIUM CHLORIDE 9 MG/ML
25 INJECTION, SOLUTION INTRAVENOUS CONTINUOUS
Status: DISCONTINUED | OUTPATIENT
Start: 2022-03-07 | End: 2022-03-07 | Stop reason: HOSPADM

## 2022-03-07 RX ORDER — FENTANYL CITRATE 50 UG/ML
INJECTION, SOLUTION INTRAMUSCULAR; INTRAVENOUS AS NEEDED
Status: DISCONTINUED | OUTPATIENT
Start: 2022-03-07 | End: 2022-03-07 | Stop reason: HOSPADM

## 2022-03-07 RX ORDER — MIDAZOLAM HYDROCHLORIDE 1 MG/ML
INJECTION, SOLUTION INTRAMUSCULAR; INTRAVENOUS AS NEEDED
Status: DISCONTINUED | OUTPATIENT
Start: 2022-03-07 | End: 2022-03-07 | Stop reason: HOSPADM

## 2022-03-07 RX ORDER — FENTANYL CITRATE 50 UG/ML
25 INJECTION, SOLUTION INTRAMUSCULAR; INTRAVENOUS
Status: DISCONTINUED | OUTPATIENT
Start: 2022-03-07 | End: 2022-03-07 | Stop reason: HOSPADM

## 2022-03-07 RX ORDER — TROPICAMIDE 10 MG/ML
1 SOLUTION/ DROPS OPHTHALMIC
Status: COMPLETED | OUTPATIENT
Start: 2022-03-07 | End: 2022-03-07

## 2022-03-07 RX ORDER — TROPICAMIDE 10 MG/ML
SOLUTION/ DROPS OPHTHALMIC
Status: COMPLETED
Start: 2022-03-07 | End: 2022-03-07

## 2022-03-07 RX ORDER — TETRACAINE HYDROCHLORIDE 5 MG/ML
SOLUTION OPHTHALMIC AS NEEDED
Status: DISCONTINUED | OUTPATIENT
Start: 2022-03-07 | End: 2022-03-07 | Stop reason: HOSPADM

## 2022-03-07 RX ORDER — NEOMYCIN SULFATE, POLYMYXIN B SULFATE, AND DEXAMETHASONE 3.5; 10000; 1 MG/G; [USP'U]/G; MG/G
OINTMENT OPHTHALMIC AS NEEDED
Status: DISCONTINUED | OUTPATIENT
Start: 2022-03-07 | End: 2022-03-07 | Stop reason: HOSPADM

## 2022-03-07 RX ORDER — CIPROFLOXACIN HYDROCHLORIDE 3.5 MG/ML
SOLUTION/ DROPS TOPICAL
Status: COMPLETED
Start: 2022-03-07 | End: 2022-03-07

## 2022-03-07 RX ADMIN — FENTANYL CITRATE 50 MCG: 50 INJECTION, SOLUTION INTRAMUSCULAR; INTRAVENOUS at 08:05

## 2022-03-07 RX ADMIN — TROPICAMIDE 1 DROP: 10 SOLUTION/ DROPS OPHTHALMIC at 07:07

## 2022-03-07 RX ADMIN — SODIUM CHLORIDE 25 ML/HR: 9 INJECTION, SOLUTION INTRAVENOUS at 07:02

## 2022-03-07 RX ADMIN — CIPROFLOXACIN 1 DROP: 3 SOLUTION OPHTHALMIC at 07:02

## 2022-03-07 RX ADMIN — CIPROFLOXACIN 1 DROP: 3 SOLUTION OPHTHALMIC at 07:07

## 2022-03-07 RX ADMIN — TROPICAMIDE 1 DROP: 10 SOLUTION/ DROPS OPHTHALMIC at 07:11

## 2022-03-07 RX ADMIN — CIPROFLOXACIN 1 DROP: 3 SOLUTION OPHTHALMIC at 07:11

## 2022-03-07 RX ADMIN — FENTANYL CITRATE 25 MCG: 50 INJECTION, SOLUTION INTRAMUSCULAR; INTRAVENOUS at 07:47

## 2022-03-07 RX ADMIN — FENTANYL CITRATE 25 MCG: 50 INJECTION, SOLUTION INTRAMUSCULAR; INTRAVENOUS at 08:01

## 2022-03-07 RX ADMIN — MIDAZOLAM HYDROCHLORIDE 2 MG: 1 INJECTION, SOLUTION INTRAMUSCULAR; INTRAVENOUS at 07:41

## 2022-03-07 RX ADMIN — TROPICAMIDE 1 DROP: 10 SOLUTION/ DROPS OPHTHALMIC at 07:02

## 2022-03-07 NOTE — ANESTHESIA POSTPROCEDURE EVALUATION
Procedure(s):  LEFT 2ND EYE CATARACT EXTRACTION WITH INTRA OCULAR LENS IMPLANT WITH I STENT. MAC    Anesthesia Post Evaluation      Multimodal analgesia: multimodal analgesia used between 6 hours prior to anesthesia start to PACU discharge  Patient location during evaluation: PACU  Level of consciousness: sleepy but conscious  Pain management: adequate  Airway patency: patent  Anesthetic complications: no  Cardiovascular status: acceptable  Respiratory status: acceptable  Hydration status: acceptable  Comments: +Post-Anesthesia Evaluation and Assessment    Patient: Fernando Holloway MRN: [id number]  SSN: [id number]   YOB: 1940  Age: 80 y.o. Sex: male      Cardiovascular Function/Vital Signs    BP (!) 107/57 (BP 1 Location: Right upper arm, BP Patient Position: At rest)   Pulse 61   Temp 36.7 °C (98.1 °F)   Resp 13   Ht 6' 2\" (1.88 m)   Wt 100.7 kg (222 lb)   SpO2 96%   BMI 28.50 kg/m²     Patient is status post Procedure(s):  LEFT 2ND EYE CATARACT EXTRACTION WITH INTRA OCULAR LENS IMPLANT WITH I STENT. Nausea/Vomiting: Controlled. Postoperative hydration reviewed and adequate. Pain:  Pain Scale 1: Numeric (0 - 10) (03/07/22 0850)  Pain Intensity 1: 0 (03/07/22 0850)   Managed. Neurological Status:   Neuro (WDL): Within Defined Limits (03/07/22 0850)   At baseline. Mental Status and Level of Consciousness: Arousable. Pulmonary Status:   O2 Device: None (Room air) (03/07/22 0850)   Adequate oxygenation and airway patent. Complications related to anesthesia: None    Post-anesthesia assessment completed. No concerns.     Signed By: Fabienne Carlos MD    3/7/2022  Post anesthesia nausea and vomiting:  controlled  Final Post Anesthesia Temperature Assessment:  Normothermia (36.0-37.5 degrees C)      INITIAL Post-op Vital signs:   Vitals Value Taken Time   /67 03/07/22 0845   Temp 36.7 °C (98.1 °F) 03/07/22 0837   Pulse 58 03/07/22 0845   Resp 15 03/07/22 0845   SpO2 95 % 03/07/22 0845

## 2022-03-07 NOTE — DISCHARGE INSTRUCTIONS
Mila Ibrahim MD  [address] [place] [address] Drive   JAKE Felder, [address]  Phone: [phone]  If you are unable to keep appointment, kindly give 24 hours notice please. REMOVE PATCH  START DROPS WHEN YOU GET HOME  PUT PATCH BACK ON AT BEDTIME    1. DO NOT RUB the eye that was operated on. 2. Do not strain excessively. It is all right to bend as long as you do not strain. 3. It is safe to take a shower, wash your face, and wash your hair. Just keep the eye closed. 4. Do not swim for 1 week after surgery. 5. If you have any problems or questions, do not hesitate to call .  6. Follow instructions on eye drops from office. 7. You may take Tylenol or Advil for discomfort. If it pressure not relieved by Tylenol or Advil, please call Dr. Brown Enrrique office. TO PREVENT AN INFECTION      1. [address] YOUR HANDS     To prevent infection, good handwashing is the most important thing you or your caregiver can do.  Wash your hands with soap and water or use the hand  we gave you before you touch any wounds. 2.  USE CLEAN SHEETS     Use freshly cleaned sheets on your bed after surgery.  To keep the surgery site clean, do not allow pets to sleep with you while your wound is still healing. 3. STOP SMOKING    Stop smoking, or at least cut back on smoking     Smoking slows your healing. 4.  CONTROL YOUR BLOOD SUGAR     High blood sugars slow wound healing.  If you are diabetic, control your blood sugar levels before and after your surgery. If you were given prescriptions, please review the written information on the prescribed medications. DO NOT DRIVE WHILE TAKING NARCOTIC PAIN MEDICATIONS.     DISCHARGE SUMMARY from Nurse    The following personal items collected during your admission are returned to you:   Dental Appliance: Dental Appliances: None  Vision: Visual Aid: None  Hearing Aid:    Jewelry: Jewelry: None  Clothing: Clothing: With patient  Other Valuables: Other Valuables: None  Valuables sent to safe:      PATIENT INSTRUCTIONS:    After general anesthesia or intravenous sedation, for 24 hours or while taking prescription Narcotics:  · Someone should be with you for the next 24 hours. · For your own safety, a responsible adult must drive you home. · Limit your activities  · Recommended activity: Rest today, Do not climb stairs or shower unattended for the next 24 hours. · Do not drive and operate hazardous machinery  · Do not make important personal or business decisions  · Do  not drink alcoholic beverages  · If you have not urinated within 8 hours after discharge, please contact your surgeon on call. Report the following to your surgeon:  · Excessive pain, swelling, redness or odor of or around the surgical area  · Temperature over 100.5  · Nausea and vomiting lasting longer than 4 hours or if unable to take medications    · You will receive a Post Operative Call from one of the Recovery Room Nurses on the day after your surgery to check on you. It is very important for us to know how you are recovering after your surgery. · You may receive an e-mail or letter in the mail from [employer] regarding your experience with us in the Ambulatory Surgery Unit. Your feedback is valuable to us and we appreciate your participation in the survey. · We wish youre a speedy recovery ? What to do at Home:      *  Please give a list of your current medications to your Primary Care Provider. *  Please update this list whenever your medications are discontinued, doses are      changed, or new medications (including over-the-counter products) are added. *  Please carry medication information at all times in case of emergency situations.           These are general instructions for a healthy lifestyle:    No smoking/ No tobacco products/ Avoid exposure to second hand smoke    Surgeon General's Warning:  Quitting smoking now greatly reduces serious risk to your health. Obesity, smoking, and sedentary lifestyle greatly increases your risk for illness    A healthy diet, regular physical exercise & weight monitoring are important for maintaining a healthy lifestyle    You may be retaining fluid if you have a history of heart failure or if you experience any of the following symptoms:  Weight gain of 3 pounds or more overnight or 5 pounds in a week, increased swelling in our hands or feet or shortness of breath while lying flat in bed. Please call your doctor as soon as you notice any of these symptoms; do not wait until your next office visit. Recognize signs and symptoms of STROKE:    B - Balance  E - Eyes    F-face looks uneven    A-arms unable to move or move even    S-speech slurred or non-existent    T-time-call 911 as soon as signs and symptoms begin-DO NOT go       Back to bed or wait to see if you get better-TIME IS BRAIN. If you have not received your influenza and/or pneumococcal vaccine, please follow up with your primary care physician. The discharge information has been reviewed with the patient and caregiver. The patient and caregiver verbalized understanding.

## 2022-03-07 NOTE — PERIOP NOTES
Patient received to PACU, VSS. Patient awake and alert with no complaints of pain. Eye shield to left eye intact. 7993: Discharge instructions given. Patient and wife verbalize understanding of instructions and follow up appointment. Patient and wife state ready for discharge. IV removed. Patient discharged at this time by wheelchair with belongings, wife to provide transportation home.

## 2022-03-07 NOTE — PERIOP NOTES
Permission received to review discharge instructions and discuss private health information with wife, Ho Hoffman. Patient states that Ho Hoffman will be with them for at least 24 hours following today's procedure.

## 2022-03-07 NOTE — ANESTHESIA PREPROCEDURE EVALUATION
Relevant Problems   No relevant active problems     Relevant Problems   No relevant active problems       Anesthetic History   No history of anesthetic complications            Review of Systems / Medical History  Patient summary reviewed, nursing notes reviewed and pertinent labs reviewed    Pulmonary  Within defined limits                 Neuro/Psych             Comments: [place]  vertigo Cardiovascular    Hypertension              Exercise tolerance: >4 METS     GI/Hepatic/Renal  Within defined limits              Endo/Other    Diabetes: type 2    Arthritis     Other Findings   Comments: Cataracts  glaucoma         Physical Exam    Airway  Mallampati: III  TM Distance: 4 - 6 cm  Neck ROM: normal range of motion   Mouth opening: Normal     Cardiovascular    Rhythm: regular  Rate: normal         Dental    Dentition: Poor dentition  Comments: Multiple missing teeth   Pulmonary  Breath sounds clear to auscultation               Abdominal  GI exam deferred       Other Findings            Anesthetic Plan    ASA: 2  Anesthesia type: MAC          Induction: Intravenous  Anesthetic plan and risks discussed with: Patient              Anesthetic History   No history of anesthetic complications            Review of Systems / Medical History  Patient summary reviewed, nursing notes reviewed and pertinent labs reviewed    Pulmonary  Within defined limits                 Neuro/Psych   Within defined limits           Cardiovascular    Hypertension          Hyperlipidemia    Exercise tolerance: >4 METS     GI/Hepatic/Renal  Within defined limits              Endo/Other    Diabetes: type 2    Arthritis     Other Findings   Comments: Glaucoma           Physical Exam    Airway  Mallampati: III  TM Distance: 4 - 6 cm  Neck ROM: normal range of motion   Mouth opening: Normal     Cardiovascular  Regular rate and rhythm,  S1 and S2 normal,  no murmur, click, rub, or gallop  Rhythm: regular  Rate: normal         Dental    Dentition: Poor dentition  Comments: Multiple missing teeth   Pulmonary  Breath sounds clear to auscultation               Abdominal  GI exam deferred       Other Findings            Anesthetic Plan    ASA: 2  Anesthesia type: MAC          Induction: Intravenous  Anesthetic plan and risks discussed with: Patient

## 2022-03-07 NOTE — OP NOTES
Date of Procedure: 3/7/2022  Preoperative Diagnosis: Nuclear Sclerotic cataract left eye H25.12, Primary open angle glaucoma left eye (Z43.4456)  Postoperative Diagnosis: Nuclear Sclerotic cataract left eye H25.12, Primay open angle glaucoma left eye (R76.8655)  Procedure: Extracapsular cataract extraction with lens implant left eye with Istent placement  Surgeon:  KOKO Cardenas MD  Assistants: None  Anesthesia: MAC with local  Estimated Blood Loss: None  Findings: Cataract left eye, primary open angle glaucoma left eye  Complications: None  Specimens: None  Prosthetic Devices: Intraocular lens implant, Istent left eye     The patient's left eye was dilated with mydriacyl 1% and ciprofloxacin 0.3% for 3 doses preoperatively. The patient was taken to the operating room and was given sedation. Tetracaine was given topically to the left eye, and the eye was prepped and draped in the usual manner for sterile eye surgery, including Betadine solution being dropped onto the conjunctiva at the beginning of the prep. The eyelashes were isolated with a plastic drape. A lid speculum was placed. Limbal relaxing incisions were made at the beginning of the case. A corneal marking gauge was used to make a 45 degree arc length at the 5 degree axis temporally and nasally. After the marking was made, as small amount of Viscoat (Duovisc) was placed on the incision sites, and a 600 micron depth earlene knife was used to make the 45 degree arc nasally and temporally one half millimeters in from the limbus.     A #15 blade was used to make a paracentesis at the 5:00 location. The eye was flushed with a lidocaine / epinephrine mixture (\"Shugarcaine\"). The eye was filled with Viscoat (Duovisc), and a crescent blade was used to make a 2.5 mm incision at the limbus temporally. This was dissected 2 mm into clear cornea, and the eye was entered with a 2.4 mm keratome. A 0.12 forceps was used for fixation during the procedure. A capsulorhexis flap was started with a cystotome, and this was completed 360 degrees with Utrata forceps. The capsular piece was removed. [place] dissection was performed with the \"Shugarcaine\" mixture on a cannula.     The lens nucleus was removed using phacoemulsification with a total phaco time of 1:52 minutes at 10.4%.     The lens was cracked and manipulated with a Sinsky hook. Residual cortex was removed using irrigation / aspiration. The capsule remained intact.     The capsule was refilled with Provisc, and an Michael Intraocular lens model [id number] power 14.5 was placed in a lens folding cartridge with Provisc.     The lens was unfolded into the capsular bag. The lens centered well. The Istent was then placed. Additional viscoat was used to fill the anterior chamber and some was placed on the cornea. The patient's head was turned nasally 45 degrees and the microscope was angled down at 45 degrees also. A gonio lens was placed and the structures of the chamber angle were clearly visible with increased magnification. The Istent was removed from its packaging and inspected and was found to be in good condition. The Istent was introduced through the main incision and the trabecular meshwork was engaged by the tip of the Istent at 3:00. It was gently inserted into the meshwork under direct visualiztion. The Istent was released and was found to be in good position. It was strummed and was found to be secure. There was a slight reflux of blood into the anterior chamber. The insertion device was removed. Residual Provisc and Viscoat were removed using I / A. One 10-0 vicryl suture was used to close the incision. The knot was tied, cut and rotated under the cornea. The eye was flushed with BSS through the paracentesis. Betadine solution was placed on the conjunctival surface at the end of the case. The eye was left soft and formed at the end of the case. The incision site was water tight.   The speculum was removed, and a drop of timolol 0.5% and neopolydex ointment was placed on the eye followed by a shield. The patient tolerated the procedure well and is to follow-up in one day.

## 2022-03-07 NOTE — BRIEF OP NOTE
Brief Postoperative Note    Patient: Cr Preciado  YOB: 1940  MRN: [id number]    Date of Procedure: 3/7/2022     Pre-Op Diagnosis: Nuclear Sclerotic cataract left eye H25.12, Primary open angle glaucoma left eye (H40.1122)    Post-Op Diagnosis: Nuclear Sclerotic cataract left eye H25.12, Primary open angle glaucoma left eye (H40.1122)    Procedure(s):  LEFT 2ND EYE CATARACT EXTRACTION WITH INTRA OCULAR LENS IMPLANT WITH I STENT    Surgeon(s):  Dhaval West MD    Surgical Assistant: None    Anesthesia: MAC     Estimated Blood Loss (mL): 0    Complications: none    Specimens: * No specimens in log *     Implants:   Implant Name Type Inv.  Item Serial No.  Lot No. LRB No. Used Action   LENS IOL POST 1-PC 6X13 14.5 -- [employer] - [id number]  LENS IOL POST 1-PC 6X13 14.5 -- [employer] [id number] [employer]_WD NA Left 1 Implanted   SHUNT EYE TRABECLAR [employer] -- ISTENT - [id number]  SHUNT EYE TRABECLAR [place] [employer] -- ISTENT [id number] Left 1 Implanted       Drains: * No LDAs found *    Findings: Visually significant cataract and glaucoma left eye    Electronically Signed by Levi Sullivan MD on 3/7/2022 at 8:43 AM

## 2022-03-07 NOTE — PERIOP NOTES
Richy Bowersr  1940  [id number]    Situation:  Verbal report given from: Patricia Perez RN and Donnie Foster CRNA  Procedure: Procedure(s):  LEFT 2ND EYE CATARACT EXTRACTION WITH INTRA OCULAR LENS IMPLANT WITH I STENT    Background:    Preoperative diagnosis: CATARACT/GLAUCOMA    Postoperative diagnosis: CATARACT/GLAUCOMA    :  Dr. Xin Moss    Assistant(s): Circ-1: Dave Pickett RN  Scrub Tech-1: [facility]    Specimens: * No specimens in log *    Assessment:  Intra-procedure medications         Anesthesia gave intra-procedure sedation and medications, see anesthesia flow sheet     Intravenous fluids: LR@ KVO     Vital signs stable       Recommendation:    Permission to share finding with wife : yes

## 2022-05-18 ENCOUNTER — HOSPITAL ENCOUNTER (OUTPATIENT)
Age: 82
Setting detail: OUTPATIENT SURGERY
Discharge: HOME | End: 2022-05-18
Attending: INTERNAL MEDICINE | Admitting: INTERNAL MEDICINE
Payer: MEDICARE

## 2022-05-18 ENCOUNTER — ANESTHESIA EVENT (OUTPATIENT)
Dept: ENDOSCOPY | Age: 82
End: 2022-05-18
Payer: MEDICARE

## 2022-05-18 ENCOUNTER — ANESTHESIA (OUTPATIENT)
Dept: ENDOSCOPY | Age: 82
End: 2022-05-18
Payer: MEDICARE

## 2022-05-18 VITALS
RESPIRATION RATE: 10 BRPM | SYSTOLIC BLOOD PRESSURE: 115 MMHG | HEART RATE: 65 BPM | HEIGHT: 74 IN | TEMPERATURE: 98.2 F | OXYGEN SATURATION: 98 % | WEIGHT: 220.9 LBS | BODY MASS INDEX: 28.35 KG/M2 | DIASTOLIC BLOOD PRESSURE: 42 MMHG

## 2022-05-18 LAB
GLUCOSE BLD STRIP.AUTO-MCNC: 135 MG/DL (ref 65–117)
SERVICE CMNT-IMP: ABNORMAL

## 2022-05-18 PROCEDURE — 74011250636 HC RX REV CODE- 250/636: Performed by: INTERNAL MEDICINE

## 2022-05-18 PROCEDURE — 74011000250 HC RX REV CODE- 250: Performed by: NURSE ANESTHETIST, CERTIFIED REGISTERED

## 2022-05-18 PROCEDURE — 88305 TISSUE EXAM BY PATHOLOGIST: CPT

## 2022-05-18 PROCEDURE — 74011250636 HC RX REV CODE- 250/636: Performed by: NURSE ANESTHETIST, CERTIFIED REGISTERED

## 2022-05-18 PROCEDURE — 82962 GLUCOSE BLOOD TEST: CPT

## 2022-05-18 PROCEDURE — 76040000019: Performed by: INTERNAL MEDICINE

## 2022-05-18 PROCEDURE — 76060000031 HC ANESTHESIA FIRST 0.5 HR: Performed by: INTERNAL MEDICINE

## 2022-05-18 PROCEDURE — 77030013992 HC SNR POLYP ENDOSC BSC -B: Performed by: INTERNAL MEDICINE

## 2022-05-18 PROCEDURE — 2709999900 HC NON-CHARGEABLE SUPPLY: Performed by: INTERNAL MEDICINE

## 2022-05-18 PROCEDURE — 74011250637 HC RX REV CODE- 250/637: Performed by: INTERNAL MEDICINE

## 2022-05-18 RX ORDER — NALOXONE HYDROCHLORIDE 0.4 MG/ML
0.4 INJECTION, SOLUTION INTRAMUSCULAR; INTRAVENOUS; SUBCUTANEOUS
Status: DISCONTINUED | OUTPATIENT
Start: 2022-05-18 | End: 2022-05-18 | Stop reason: HOSPADM

## 2022-05-18 RX ORDER — LIDOCAINE HYDROCHLORIDE 20 MG/ML
INJECTION, SOLUTION EPIDURAL; INFILTRATION; INTRACAUDAL; PERINEURAL AS NEEDED
Status: DISCONTINUED | OUTPATIENT
Start: 2022-05-18 | End: 2022-05-18 | Stop reason: HOSPADM

## 2022-05-18 RX ORDER — EPINEPHRINE 0.1 MG/ML
1 INJECTION INTRACARDIAC; INTRAVENOUS
Status: DISCONTINUED | OUTPATIENT
Start: 2022-05-18 | End: 2022-05-18 | Stop reason: HOSPADM

## 2022-05-18 RX ORDER — FLUMAZENIL 0.1 MG/ML
0.2 INJECTION INTRAVENOUS
Status: DISCONTINUED | OUTPATIENT
Start: 2022-05-18 | End: 2022-05-18 | Stop reason: HOSPADM

## 2022-05-18 RX ORDER — SODIUM CHLORIDE 0.9 % (FLUSH) 0.9 %
5-40 SYRINGE (ML) INJECTION AS NEEDED
Status: DISCONTINUED | OUTPATIENT
Start: 2022-05-18 | End: 2022-05-18 | Stop reason: HOSPADM

## 2022-05-18 RX ORDER — MIDAZOLAM HYDROCHLORIDE 1 MG/ML
.25-5 INJECTION, SOLUTION INTRAMUSCULAR; INTRAVENOUS
Status: DISCONTINUED | OUTPATIENT
Start: 2022-05-18 | End: 2022-05-18 | Stop reason: HOSPADM

## 2022-05-18 RX ORDER — ATROPINE SULFATE 0.1 MG/ML
0.5 INJECTION INTRAVENOUS
Status: DISCONTINUED | OUTPATIENT
Start: 2022-05-18 | End: 2022-05-18 | Stop reason: HOSPADM

## 2022-05-18 RX ORDER — DEXTROMETHORPHAN/PSEUDOEPHED 2.5-7.5/.8
1.2 DROPS ORAL
Status: DISCONTINUED | OUTPATIENT
Start: 2022-05-18 | End: 2022-05-18 | Stop reason: HOSPADM

## 2022-05-18 RX ORDER — PROPOFOL 10 MG/ML
INJECTION, EMULSION INTRAVENOUS AS NEEDED
Status: DISCONTINUED | OUTPATIENT
Start: 2022-05-18 | End: 2022-05-18 | Stop reason: HOSPADM

## 2022-05-18 RX ORDER — PHENYLEPHRINE HCL IN 0.9% NACL 0.4MG/10ML
SYRINGE (ML) INTRAVENOUS AS NEEDED
Status: DISCONTINUED | OUTPATIENT
Start: 2022-05-18 | End: 2022-05-18 | Stop reason: HOSPADM

## 2022-05-18 RX ORDER — SODIUM CHLORIDE 9 MG/ML
75 INJECTION, SOLUTION INTRAVENOUS CONTINUOUS
Status: DISCONTINUED | OUTPATIENT
Start: 2022-05-18 | End: 2022-05-18 | Stop reason: HOSPADM

## 2022-05-18 RX ORDER — SODIUM CHLORIDE 0.9 % (FLUSH) 0.9 %
5-40 SYRINGE (ML) INJECTION EVERY 8 HOURS
Status: DISCONTINUED | OUTPATIENT
Start: 2022-05-18 | End: 2022-05-18 | Stop reason: HOSPADM

## 2022-05-18 RX ADMIN — Medication 80 MG: at 12:32

## 2022-05-18 RX ADMIN — LIDOCAINE HYDROCHLORIDE 100 MG: 20 INJECTION, SOLUTION EPIDURAL; INFILTRATION; INTRACAUDAL; PERINEURAL at 12:25

## 2022-05-18 RX ADMIN — SODIUM CHLORIDE: 0.9 INJECTION, SOLUTION INTRAVENOUS at 12:20

## 2022-05-18 RX ADMIN — PROPOFOL 50 MG: 10 INJECTION, EMULSION INTRAVENOUS at 12:29

## 2022-05-18 RX ADMIN — Medication 80 MCG: at 12:52

## 2022-05-18 RX ADMIN — PROPOFOL 100 MG: 10 INJECTION, EMULSION INTRAVENOUS at 12:25

## 2022-05-18 RX ADMIN — PROPOFOL 50 MG: 10 INJECTION, EMULSION INTRAVENOUS at 12:33

## 2022-05-18 NOTE — ANESTHESIA PREPROCEDURE EVALUATION
Relevant Problems   No relevant active problems       Anesthetic History   No history of anesthetic complications            Review of Systems / Medical History  Patient summary reviewed, nursing notes reviewed and pertinent labs reviewed    Pulmonary  Within defined limits                 Neuro/Psych   Within defined limits           Cardiovascular    Hypertension              Exercise tolerance: >4 METS     GI/Hepatic/Renal  Within defined limits              Endo/Other    Diabetes: type 2    Arthritis     Other Findings              Physical Exam    Airway  Mallampati: III  TM Distance: 4 - 6 cm  Neck ROM: normal range of motion   Mouth opening: Normal     Cardiovascular  Regular rate and rhythm,  S1 and S2 normal,  no murmur, click, rub, or gallop  Rhythm: regular  Rate: normal         Dental  No notable dental hx       Pulmonary  Breath sounds clear to auscultation               Abdominal  GI exam deferred       Other Findings            Anesthetic Plan    ASA: 3  Anesthesia type: MAC          Induction: Intravenous  Anesthetic plan and risks discussed with: Patient

## 2022-05-18 NOTE — PERIOP NOTES
Endoscopy Case End Note:    1247:  Procedure scope was pre-cleaned, per protocol, at bedside by Eneida Bowman. 1249:  Report received from anesthesia - [facility]. See anesthesia flowsheet for intra-procedure vital signs and events. 1250:  Rectal polyp removed during procedure but was not retrieved. Dr. Aminta Meyer aware.

## 2022-05-18 NOTE — ROUTINE PROCESS
[facility]  1940  [id number]    Situation:  Verbal report received from: Barby Mcleod  Procedure: Procedure(s):  COLONOSCOPY  ENDOSCOPIC POLYPECTOMY    Background:    Preoperative diagnosis: + [place]-guard, Hisotry of Colon Polyps  Postoperative diagnosis: colon polyps, diverticulosis, hemorrhoids    :  Dr. Estefanía Bruce  Assistant(s): Endoscopy Technician-1: Mike Garrison  Endoscopy RN-1: Iris Quinn RN    Specimens:   ID Type Source Tests Collected by Time Destination   1 : Transverse Colon Polyp Preservative Colon, Transverse  Juan Alonso MD 5/18/2022 1243 Pathology     H. Pylori  no    Assessment:  Intra-procedure medications     Anesthesia gave intra-procedure sedation and medications, see anesthesia flow sheet yes    Intravenous fluids: NS@ KVO     Vital signs stable     Abdominal assessment: round and soft     Recommendation:  Discharge patient per MD order.   Return to floor  Family or Friend   Permission to share finding with family or friend yes

## 2022-05-18 NOTE — DISCHARGE INSTRUCTIONS
[place] Office: [phone]    Soni Angeles  [id number]  1940    EGD/COLONOSCOPY DISCHARGE INSTRUCTIONS  Discomfort:  Sore throat- throat lozenges or warm salt water gargle  redness at IV site- apply warm compress to area; if redness or soreness persist- contact your physician  Gaseous discomfort- walking, belching will help relieve any discomfort  You may not operate a vehicle for 12 hours  You may not engage in an occupation involving machinery or appliances for rest of today. You may not drink alcoholic beverages for at least 12 hours  Avoid making any critical decisions for at least 24 hour  DIET  You may resume your regular diet - however -  remember your colon is empty and a heavy meal will produce gas. Avoid these foods:  fried / greasy foods, excessive carbonated drinks or too much caffeine  MEDICATIONS   Regarding Aspirin or Nonsteroidal medications specifically, please see below. ACTIVITY  You may resume your normal daily activities. Spend the remainder of the day resting -  avoid any strenuous activity. CALL M.D. ANY SIGN OF   Increasing pain, nausea, vomiting  Abdominal distension (swelling)  New increased bleeding (oral or rectal)  Fever (chills)  Pain in chest area  Bloody discharge from nose or mouth  Shortness of breath    You may not take any Advil, Aspirin, Ibuprofen, Motrin, Aleve, or Goodys for 7 days, ONLY  Tylenol as needed for pain. Follow-up Instructions:   Call  Jose Mayfield MD for any questions or concerns  Results of procedure / biopsy in 7 days   Telephone # [phone]      Follow-up Information    None

## 2022-05-18 NOTE — H&P
Pre-endoscopy H and P    The patient was seen and examined in the room/pre-op holding area. The airway was assessed and documented. The problem list, past medical history, and medications were reviewed. There is no problem list on file for this patient. Social History     Socioeconomic History    Marital status:      Spouse name: Not on file    Number of children: Not on file    Years of education: Not on file    Highest education level: Not on file   Occupational History    Not on file   Tobacco Use    Smoking status: Never Smoker    Smokeless tobacco: Never Used   Substance and Sexual Activity    Alcohol use: No    Drug use: No    Sexual activity: Not on file   Other Topics Concern    Not on file   Social History Narrative    Not on file     Social Determinants of Health     Financial Resource Strain:     Difficulty of Paying Living Expenses: Not on file   Food Insecurity:     Worried About Running Out of Food in the Last Year: Not on file    Paulo of Food in the Last Year: Not on file   Transportation Needs:     Lack of Transportation (Medical): Not on file    Lack of Transportation (Non-Medical):  Not on file   Physical Activity:     Days of Exercise per Week: Not on file    Minutes of Exercise per Session: Not on file   Stress:     Feeling of Stress : Not on file   Social Connections:     Frequency of Communication with Friends and Family: Not on file    Frequency of Social Gatherings with Friends and Family: Not on file    Attends Rastafari Services: Not on file    Active Member of Clubs or Organizations: Not on file    Attends Club or Organization Meetings: Not on file    Marital Status: Not on file   Intimate Partner Violence:     Fear of Current or Ex-Partner: Not on file    Emotionally Abused: Not on file    Physically Abused: Not on file    Sexually Abused: Not on file   Housing Stability:     Unable to Pay for Housing in the Last Year: Not on file    Number of Places Lived in the Last Year: Not on file    Unstable Housing in the Last Year: Not on file     Past Medical History:   Diagnosis Date    Arthritis     Cataracts, bilateral     Diabetes ([address] dx age 74    type 2    Glaucoma     just had surgery to release pressure and now pressures WNL    High cholesterol     [place] (hard of hearing)     no hearing aids    Hypertension     Vertigo     one episode, 01/2022         Prior to Admission Medications   Prescriptions Last Dose Informant Patient Reported? Taking?   atorvastatin (LIPITOR) 40 mg tablet 5/17/2022 at Unknown time  Yes Yes   Sig: Take 40 mg by mouth daily. lisinopril (PRINIVIL, ZESTRIL) 10 mg tablet 5/18/2022 at Unknown time  Yes Yes   Sig: Take 10 mg by mouth daily. Indications: hypertension   metFORMIN (GLUCOPHAGE) 1,000 mg tablet 5/17/2022 at Unknown time  Yes Yes   Sig: Take 1,000 mg by mouth two (2) times daily (with meals). Facility-Administered Medications: None           The review of systems is:  Negative  for shortness of breath or chest pain      The heart, lungs, and mental status were satisfactory for the administration of deep sedation and for the procedure. I discussed with the patient the objectives, risks, consequences and alternatives to the procedure.       Mara Merrill MD  5/18/2022  12:21 PM

## 2022-05-18 NOTE — ANESTHESIA POSTPROCEDURE EVALUATION
Procedure(s):  COLONOSCOPY  ENDOSCOPIC POLYPECTOMY. MAC    Anesthesia Post Evaluation      Multimodal analgesia: multimodal analgesia used between 6 hours prior to anesthesia start to PACU discharge  Patient location during evaluation: PACU  Patient participation: complete - patient participated  Level of consciousness: sleepy but conscious and responsive to verbal stimuli  Pain score: 1  Pain management: adequate  Airway patency: patent  Anesthetic complications: no  Cardiovascular status: acceptable  Respiratory status: acceptable  Hydration status: acceptable  Comments: +Post-Anesthesia Evaluation and Assessment    Patient: Tony Vogt MRN: [id number]  SSN: [id number]   YOB: 1940  Age: [de-identified] y.o. Sex: male      Cardiovascular Function/Vital Signs    BP (!) 108/52   Pulse 67   Temp 36.5 °C (97.7 °F)   Resp (!) 7   Ht 6' 2\" (1.88 m)   Wt 100.2 kg (220 lb 14.4 oz)   SpO2 95%   BMI 28.36 kg/m²     Patient is status post Procedure(s):  COLONOSCOPY  ENDOSCOPIC POLYPECTOMY. Nausea/Vomiting: Controlled. Postoperative hydration reviewed and adequate. Pain:  Pain Scale 1: Numeric (0 - 10) (05/18/22 1255)  Pain Intensity 1: 0 (05/18/22 1255)   Managed. Neurological Status: At baseline. Mental Status and Level of Consciousness: Arousable. Pulmonary Status:   O2 Device: None (Room air) (05/18/22 1255)   Adequate oxygenation and airway patent. Complications related to anesthesia: None    Post-anesthesia assessment completed. No concerns. Signed By: Melly Echeverria MD    5/18/2022  Post anesthesia nausea and vomiting:  controlled  Final Post Anesthesia Temperature Assessment:  Normothermia (36.0-37.5 degrees C)      INITIAL Post-op Vital signs:   Vitals Value Taken Time   /42 05/18/22 1306   Temp     Pulse 62 05/18/22 1308   Resp 2 05/18/22 1308   SpO2 94 % 05/18/22 1307   Vitals shown include unvalidated device data.

## 2022-05-18 NOTE — PROCEDURES
Colonoscopy Procedure Note    Angelica Powers  1940  [id number]    Indications:  Please see below. Pre-operative Diagnosis: + Fecal  Cologuard, Hisotry of Colon Polyps    Post-operative Diagnosis: colon polyps, diverticulosis, hemorrhoids, external anal skin tags      : Jose Gomez MD    Referring Provider: Juan Diego Bell MD    Sedation:  MAC anesthesia Propofol        Procedure Details:    After detailed informed consent was obtained with all risks and benefits of procedure explained and preoperative exam completed, the patient was taken to the endoscopy suite and placed in the left lateral decubitus position. Upon sequential sedation as per above, a digital rectal exam was performed  and was normal.  The Olympus videocolonoscope  was inserted in the rectum and carefully advanced to the cecum, which was identified by the ileocecal valve and appendiceal orifice. The quality of preparation was fair. The colonoscope was slowly withdrawn with careful evaluation between folds. Retroflexion in the rectum was performed. Findings:   · The Olympus video high-definition colonoscope was advanced to the cecum, identified by its typical land marks, with some difficulty. · Colon is redundant requiring counter abdominal pressure to reach cecum. · Again the prep was not optimal especially in the right colon where flat/sessile lesions can be missed, even with aggressive washings/ simethicone. · A sessile 1 cm transverse colon polyp and a 6 mm recto-sigmoid polyp was removed with a cold snare. · Sigmoid ,descedning, transverse and ascending colon mild diverticulosis noted. · Large internal hemorrhoids with external skin tags noted           Therapies:  2 complete polypectomies  were performed using cold specimen retrieved and the polyps were  retrieved (Distal polyp not retrieved)    Specimen/s:  Specimens were collected as described above and sent to pathology.        Complications: None were encountered during the procedure. EBL:  None. Recommendations:     -Await pathology.  -Consider CT A/P with contrast.  -Repeat colonoscopy pending biopsies. Alfred Wiggins MD  5/18/2022  12:48 PM

## 2022-05-26 ENCOUNTER — TRANSCRIBE ORDER (OUTPATIENT)
Dept: SCHEDULING | Age: 82
End: 2022-05-26

## 2022-05-26 DIAGNOSIS — Z86.010 PERSONAL HISTORY OF COLONIC POLYPS: ICD-10-CM

## 2022-05-26 DIAGNOSIS — R19.5 ABNORMAL FECES: Primary | ICD-10-CM

## (undated) DEVICE — SOLUTION IV 250ML 0.9% SOD CHL CLR INJ FLX BG CONT PRT CLSR

## (undated) DEVICE — 3M™ TEGADERM™ TRANSPARENT FILM DRESSING FRAME STYLE, 1624W, 2-3/8 IN X 2-3/4 IN (6 CM X 7 CM), 100/CT 4CT/CASE: Brand: 3M™ TEGADERM™

## (undated) DEVICE — SOLUTION IRRIG 500ML STRL H2O NONPYROGENIC

## (undated) DEVICE — SURGICAL PROCEDURE PACK EYE CUST DR CHNDLR

## (undated) DEVICE — GLOVE SURG SZ 75 CRM LTX FREE POLYISOPRENE POLYMER BEAD ANTI

## (undated) DEVICE — Z DISCONTINUED PER MEDLINE LINE GAS SAMPLING O2/CO2 LNG AD 13 FT NSL W/ TBNG FILTERLINE

## (undated) DEVICE — STRAINER URIN CALC RNL MSH -- CONVERT TO ITEM 357634

## (undated) DEVICE — ELECTRODE,RADIOTRANSLUCENT,FOAM,5PK: Brand: MEDLINE

## (undated) DEVICE — NON-REM POLYHESIVE PATIENT RETURN ELECTRODE: Brand: VALLEYLAB

## (undated) DEVICE — 1200 GUARD II KIT W/5MM TUBE W/O VAC TUBE: Brand: GUARDIAN

## (undated) DEVICE — KENDALL DL ECG CABLE AND LEAD WIRE SYSTEM, 3-LEAD, SINGLE PATIENT USE: Brand: KENDALL

## (undated) DEVICE — SURGICAL PROCEDURE PACK CATRCT CUST

## (undated) DEVICE — SYR 3ML LL TIP 1/10ML GRAD --

## (undated) DEVICE — THE MONARCH® "D" CARTRIDGE IS A SINGLE-USE POLYPROPYLENE CARTRIDGE FOR POSTERIOR CHAMBER IOL DELIVERY: Brand: MONARCH® III

## (undated) DEVICE — TRAP,MUCUS SPECIMEN, 80CC: Brand: MEDLINE

## (undated) DEVICE — SET ADMIN 16ML TBNG L100IN 2 Y INJ SITE IV PIGGY BK DISP

## (undated) DEVICE — SYRINGE 50ML E/T

## (undated) DEVICE — CATH IV AUTOGRD BC PNK 20GA 25 -- INSYTE

## (undated) DEVICE — NEONATAL-ADULT SPO2 SENSOR: Brand: NELLCOR

## (undated) DEVICE — Device

## (undated) DEVICE — SOLIDIFIER FLD 2OZ 1500CC N DISINF IN BTL DISP SAFESORB

## (undated) DEVICE — TOWEL 4 PLY TISS 19X30 SUE WHT

## (undated) DEVICE — CONTAINER SPEC 20 ML LID NEUT BUFF FORMALIN 10 % POLYPR STS

## (undated) DEVICE — SYR 10ML LUER LOK 1/5ML GRAD --

## (undated) DEVICE — SNARE ENDOSCP M L240CM W27MM SHTH DIA2.4MM CHN 2.8MM OVL

## (undated) DEVICE — HYPODERMIC SAFETY NEEDLE: Brand: MAGELLAN

## (undated) DEVICE — BASIN EMSIS 16OZ GRAPHITE PLAS KID SHP MOLD GRAD FOR ORAL

## (undated) DEVICE — HIGH FLOW RATE EXTENSION SET, LUER LOCK ADAPTERS

## (undated) DEVICE — TOWEL SURG W17XL27IN STD BLU COT NONFENESTRATED PREWASHED